# Patient Record
Sex: MALE | Race: WHITE | URBAN - METROPOLITAN AREA
[De-identification: names, ages, dates, MRNs, and addresses within clinical notes are randomized per-mention and may not be internally consistent; named-entity substitution may affect disease eponyms.]

---

## 2017-08-02 ENCOUNTER — THERAPY VISIT (OUTPATIENT)
Dept: PHYSICAL THERAPY | Facility: CLINIC | Age: 27
End: 2017-08-02
Payer: COMMERCIAL

## 2017-08-02 DIAGNOSIS — M25.512 ACUTE PAIN OF LEFT SHOULDER: Primary | ICD-10-CM

## 2017-08-02 PROCEDURE — 97161 PT EVAL LOW COMPLEX 20 MIN: CPT | Mod: GP | Performed by: PHYSICAL THERAPIST

## 2017-08-02 PROCEDURE — 97112 NEUROMUSCULAR REEDUCATION: CPT | Mod: GP | Performed by: PHYSICAL THERAPIST

## 2017-08-02 PROCEDURE — 97110 THERAPEUTIC EXERCISES: CPT | Mod: GP | Performed by: PHYSICAL THERAPIST

## 2017-08-02 NOTE — MR AVS SNAPSHOT
"              After Visit Summary   8/2/2017    Adolfo Singleton    MRN: [id number]           Patient Information     Date Of Birth          1990        Visit Information        Provider Department      8/2/2017 10:00 AM Carolyn Birmingham, PT [facility]        Today's Diagnoses     Acute pain of left shoulder    -  1       Follow-ups after your visit        Your next 10 appointments already scheduled     Aug 14, 2017 10:00 AM CDT   PAL[facility]IRA Extremity with Carolyn Birmingham PT   [facility] ([facility]  )    [address] [place] [address] [phone]              Who to contact     If you have questions or need follow up information about today's clinic visit or your schedule please contact [facility] directly at [phone].  Normal or non-critical lab and imaging results will be communicated to you by PadSquadhart, letter or phone within 4 business days after the clinic has received the results. If you do not hear from us within 7 days, please contact the clinic through PadSquadhart or phone. If you have a critical or abnormal lab result, we will notify you by phone as soon as possible.  Submit refill requests through [email or web address] or call your pharmacy and they will forward the refill request to us. Please allow 3 business days for your refill to be completed.          Additional Information About Your Visit        MyChart Information     [email or web address] lets you send messages to your doctor, view your test results, renew your prescriptions, schedule appointments and more. To sign up, go to [email or web address] . Click on \"Log in\" on the left side of the screen, which will take you to the Welcome page. Then click on \"Sign up Now\" on the right side of the page.     You will be asked to enter the access code listed below, as well as some personal information. " Please follow the directions to create your username and password.     Your access code is: [email or web address]  Expires: 10/31/2017  4:12 PM     Your access code will  in 90 days. If you need help or a new code, please call your [place] [facility] clinic or [phone].        Care EveryWhere ID     This is your Care EveryWhere ID. This could be used by other organizations to access your [place] [facility] medical records  [id number]         Blood Pressure from Last 3 Encounters:   No data found for BP    Weight from Last 3 Encounters:   No data found for Wt              We Performed the Following     HC PT EVAL, LOW COMPLEXITY     P[facility]A INITIAL EVAL REPORT     NEUROMUSCULAR RE-EDUCATION     THERAPEUTIC EXERCISES        Primary Care Provider    None Specified       No primary provider on file.        Equal Access to Services     JAMES MOORE : Hadii bj Moyer, wacarlosda louiseadaha, qaybta kaalmada watsonyarahul, violetta smith . So [facility] [phone].    ATENCIÓN: Si habla español, tiene a rowley disposición servicios gratuitos de asistencia lingüística. Llame al [phone].    We comply with applicable federal civil rights laws and [place] laws. We do not discriminate on the basis of race, color, national origin, age, disability sex, sexual orientation or gender identity.            Thank you!     Thank you for choosing [facility]  for your care. Our goal is always to provide you with excellent care. Hearing back from our patients is one way we can continue to improve our services. Please take a few minutes to complete the written survey that you may receive in the mail after your visit with us. Thank you!             Your Updated Medication List - Protect others around you: Learn how to safely use, store and throw away your medicines at www.disposemymeds.org.      Notice  As of 2017  4:12 PM    You have not been prescribed any medications.

## 2017-08-02 NOTE — PROGRESS NOTES
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  His hours are flexible and he has chosen to take several weeks off due to incr L shoulder pain with repetitive lifting and the knowledge that his new career will involve heavy lifting/overhead tasks.  Previous hx of Bankart repair 2016 after a football injury dislocation with hospital reduction in 2009.  Good surgical outcome, gained mobility back but stopped PT after 2 months and feels weak, does not have endurance which he feels is increasing his pain with lifting.     Subjective:  DOI/onset: 6/1/2017 DOS:   Acute Injury or Gradual Onset?: gradual Mechanism of Injury: repetitive movement  Related PMH:original injury shoulder dislocation L, surgical procedure years later, this episode insidous but after repetitive lifting  Previous Treatment: some PT  Effect of prior treatment: good mobility, lacks strength/endurance  Imaging:   Chief Complaint/Functional Limitations: hurts to lift heavy objects or with repetitive lifting  Pain: 1-4/10 Location:L anterior shoulder Frequency: with activity and after Described as: sore, ache, feels weak Alleviated by: avoiding lifting/repetitive tasks  Progression of Symptoms:  Getting worse  Time of day when pain is worse: activity  Sleeping:  n/a  Occupation:  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[employer], works in commercial kitchen Job duties: lifting, repetitive tasks  Current HEP/exercise regimen: none, wants to resume gym/lifting  Patient's goals are lift and perform repetitive overhead activities without pain    Other pertinent PMH/Red Flags:smoker  Barriers at home/work: none  Pertinent Surgical History: Bankart repair 2016  Medications:none  General health as reported by patient: fair  Return to MD: self  "referral    HPI                    Objective:    Standing Alignment:    Cervical/Thoracic:  Forward head  Shoulder/UE:  Rounded shoulders, protracted scapula L and protracted scapula R                                       Shoulder Evaluation:  ROM:  AROM:    Flexion:  Left:  165    Right:  165  Extension: Left: 60Right: 60  Abduction:  Left: 165   Right:  165                Flexion/External Rotation:  Left:  Scapular  border middle    Right:  Scapular border lateral  Extension/Internal Rotation:  Left:  1\" shy of inf angle    Right:  Shy of inf angle      Pain: L shoulder rotations    Strength:    Flexion: Left:5/5   Pain:    Right: 5/5     Pain:     Abduction:  Left: 5-/5  Pain:    Right: 5/5     Pain:    Internal Rotation:  Left:5/5     Pain:    Right: 5/5     Pain:  External Rotation:   Left:5-/5     Pain:   Right:5/5     Pain:    Horizontal Abduction:  Right:4+/5    Pain:  Horizontal Adduction:  Left:5-/5     Pain:          Stability Testing:  not assessed      Special Tests:      Left shoulder negative for the following special tests:  Impingement; Bursal; Rotator cuff tear and Acromioclavicular    Palpation:  not assessed      Mobility Tests:  not assessed                                                 General     ROS    Assessment/Plan:      Patient is a 26 year old male with left side shoulder complaints.    Patient has the following significant findings with corresponding treatment plan.                Diagnosis 1:  L shoulder pain  Pain -  hot/cold therapy, manual therapy, self management, education, directional preference exercise and home program  Decreased ROM/flexibility - manual therapy and therapeutic exercise  Decreased strength - therapeutic exercise and therapeutic activities  Decreased proprioception - neuro re-education and therapeutic activities  Impaired muscle performance - neuro re-education  Decreased function - therapeutic activities    Therapy Evaluation Codes:   1) History comprised " of:   Personal factors that impact the plan of care:      None.    Comorbidity factors that impact the plan of care are:      None.     Medications impacting care: None.  2) Examination of Body Systems comprised of:   Body structures and functions that impact the plan of care:      Shoulder and Thoracic Spine.   Activity limitations that impact the plan of care are:      Lifting, Working and reaching.  3) Clinical presentation characteristics are:   Stable/Uncomplicated.  4) Decision-Making    Low complexity using standardized patient assessment instrument and/or measureable assessment of functional outcome.  Cumulative Therapy Evaluation is: Low complexity.    Previous and current functional limitations:  (See Goal Flow Sheet for this information)    Short term and Long term goals: (See Goal Flow Sheet for this information)     Communication ability:  Patient appears to be able to clearly communicate and understand verbal and written communication and follow directions correctly.  Treatment Explanation - The following has been discussed with the patient:   RX ordered/plan of care  Anticipated outcomes  Possible risks and side effects  This patient would benefit from PT intervention to resume normal activities.   Rehab potential is excellent.    Frequency:  2 X a month, once daily  Duration:  for 2 months  Discharge Plan:  Achieve all LTG.  Independent in home treatment program.  Reach maximal therapeutic benefit.    Please refer to the daily flowsheet for treatment today, total treatment time and time spent performing 1:1 timed codes.